# Patient Record
Sex: MALE | Race: BLACK OR AFRICAN AMERICAN | URBAN - METROPOLITAN AREA
[De-identification: names, ages, dates, MRNs, and addresses within clinical notes are randomized per-mention and may not be internally consistent; named-entity substitution may affect disease eponyms.]

---

## 2021-04-16 ENCOUNTER — CLAIMS CREATED FROM THE CLAIM WINDOW (OUTPATIENT)
Dept: URBAN - METROPOLITAN AREA CLINIC 17 | Facility: CLINIC | Age: 51
End: 2021-04-16
Payer: COMMERCIAL

## 2021-04-16 ENCOUNTER — WEB ENCOUNTER (OUTPATIENT)
Dept: URBAN - METROPOLITAN AREA CLINIC 17 | Facility: CLINIC | Age: 51
End: 2021-04-16

## 2021-04-16 ENCOUNTER — DASHBOARD ENCOUNTERS (OUTPATIENT)
Age: 51
End: 2021-04-16

## 2021-04-16 DIAGNOSIS — Z12.11 SCREEN FOR COLON CANCER: ICD-10-CM

## 2021-04-16 DIAGNOSIS — Z79.4 LONG TERM (CURRENT) USE OF INSULIN: ICD-10-CM

## 2021-04-16 DIAGNOSIS — K21.00 GASTROESOPHAGEAL REFLUX DISEASE WITH ESOPHAGITIS WITHOUT HEMORRHAGE: ICD-10-CM

## 2021-04-16 DIAGNOSIS — K92.89 GAS BLOAT SYNDROME: ICD-10-CM

## 2021-04-16 DIAGNOSIS — E55.9 VITAMIN D DEFICIENCY DISEASE: ICD-10-CM

## 2021-04-16 DIAGNOSIS — E65 CENTRAL OBESITY: ICD-10-CM

## 2021-04-16 DIAGNOSIS — R49.0 HOARSENESS OF VOICE: ICD-10-CM

## 2021-04-16 DIAGNOSIS — E11.9 TYPE 2 DIABETES MELLITUS WITHOUT COMPLICATIONS: ICD-10-CM

## 2021-04-16 PROBLEM — 248311001: Status: ACTIVE | Noted: 2021-04-16

## 2021-04-16 PROBLEM — 34713006: Status: ACTIVE | Noted: 2021-04-16

## 2021-04-16 PROBLEM — 710815001: Status: ACTIVE | Noted: 2021-04-16

## 2021-04-16 PROCEDURE — 99244 OFF/OP CNSLTJ NEW/EST MOD 40: CPT | Performed by: INTERNAL MEDICINE

## 2021-04-16 PROCEDURE — 99204 OFFICE O/P NEW MOD 45 MIN: CPT | Performed by: INTERNAL MEDICINE

## 2021-04-16 RX ORDER — SEVELAMER CARBONATE 800 MG/1
TABLET, FILM COATED ORAL
Qty: 480 UNSPECIFIED | Status: ACTIVE | COMMUNITY

## 2021-04-16 RX ORDER — SEVELAMER CARBONATE 800 MG/1
1 TABLET WITH MEALS TABLET, FILM COATED ORAL THREE TIMES A DAY
Status: ACTIVE | COMMUNITY

## 2021-04-16 RX ORDER — INSULIN GLARGINE 100 [IU]/ML
INJECTION, SOLUTION SUBCUTANEOUS
Qty: 30 UNSPECIFIED | Status: ACTIVE | COMMUNITY

## 2021-04-16 RX ORDER — INSULIN ASPART 100 [IU]/ML
INJECTION, SOLUTION INTRAVENOUS; SUBCUTANEOUS
Qty: 10 UNSPECIFIED | Status: ACTIVE | COMMUNITY

## 2021-04-16 NOTE — HPI-TODAY'S VISIT:
The pt with history of type 2 DM and hyperlipidemia who presents for a screening colon. The pt notes that he has  had intermittent heartburn and indigestion . He notes that he will skip breakfast and will have fast food and for dinner he will have beef and pork.  He notes that he drinks juice daily. He notes that he has hoarseness of voice. The pt notes that he has smoke cigarettes in the past and he quit smoking pot and cigarettes.,  The patient's consulatation note will be sent to the referring MD, Dr. Yobani Redmond.

## 2021-04-19 PROBLEM — 313436004: Status: ACTIVE | Noted: 2021-04-16

## 2021-04-22 ENCOUNTER — OFFICE VISIT (OUTPATIENT)
Dept: URBAN - METROPOLITAN AREA CLINIC 16 | Facility: CLINIC | Age: 51
End: 2021-04-22
Payer: COMMERCIAL

## 2021-04-22 DIAGNOSIS — Q61.02 BILATERAL RENAL CYSTS: ICD-10-CM

## 2021-04-22 DIAGNOSIS — N28.9 KIDNEY DISEASE: ICD-10-CM

## 2021-04-22 PROCEDURE — 76700 US EXAM ABDOM COMPLETE: CPT | Performed by: INTERNAL MEDICINE

## 2021-04-22 RX ORDER — INSULIN ASPART 100 [IU]/ML
INJECTION, SOLUTION INTRAVENOUS; SUBCUTANEOUS
Qty: 10 UNSPECIFIED | Status: ACTIVE | COMMUNITY

## 2021-04-22 RX ORDER — INSULIN GLARGINE 100 [IU]/ML
INJECTION, SOLUTION SUBCUTANEOUS
Qty: 30 UNSPECIFIED | Status: ACTIVE | COMMUNITY

## 2021-04-22 RX ORDER — SEVELAMER CARBONATE 800 MG/1
1 TABLET WITH MEALS TABLET, FILM COATED ORAL THREE TIMES A DAY
Status: ACTIVE | COMMUNITY

## 2021-04-22 RX ORDER — SEVELAMER CARBONATE 800 MG/1
TABLET, FILM COATED ORAL
Qty: 480 UNSPECIFIED | Status: ACTIVE | COMMUNITY

## 2021-11-05 ENCOUNTER — OFFICE VISIT (OUTPATIENT)
Dept: URBAN - METROPOLITAN AREA SURGERY CENTER 16 | Facility: SURGERY CENTER | Age: 51
End: 2021-11-05

## 2022-03-04 PROBLEM — 266433003: Status: ACTIVE | Noted: 2022-03-04

## 2022-03-15 ENCOUNTER — OFFICE VISIT (OUTPATIENT)
Dept: URBAN - METROPOLITAN AREA SURGERY CENTER 16 | Facility: SURGERY CENTER | Age: 52
End: 2022-03-15

## 2022-09-15 ENCOUNTER — HOSPITAL ENCOUNTER (OUTPATIENT)
Dept: HOSPITAL 5 - GIO | Age: 52
Discharge: HOME | End: 2022-09-15
Attending: INTERNAL MEDICINE
Payer: MEDICARE

## 2022-09-15 VITALS — SYSTOLIC BLOOD PRESSURE: 127 MMHG | DIASTOLIC BLOOD PRESSURE: 80 MMHG

## 2022-09-15 DIAGNOSIS — K44.9: ICD-10-CM

## 2022-09-15 DIAGNOSIS — K63.5: ICD-10-CM

## 2022-09-15 DIAGNOSIS — R63.4: ICD-10-CM

## 2022-09-15 DIAGNOSIS — K62.1: ICD-10-CM

## 2022-09-15 DIAGNOSIS — Z87.891: ICD-10-CM

## 2022-09-15 DIAGNOSIS — I12.0: ICD-10-CM

## 2022-09-15 DIAGNOSIS — N18.6: ICD-10-CM

## 2022-09-15 DIAGNOSIS — D64.9: Primary | ICD-10-CM

## 2022-09-15 DIAGNOSIS — Z79.4: ICD-10-CM

## 2022-09-15 DIAGNOSIS — Z99.2: ICD-10-CM

## 2022-09-15 DIAGNOSIS — E11.22: ICD-10-CM

## 2022-09-15 DIAGNOSIS — Z79.899: ICD-10-CM

## 2022-09-15 DIAGNOSIS — K29.70: ICD-10-CM

## 2022-09-15 DIAGNOSIS — Z90.49: ICD-10-CM

## 2022-09-15 DIAGNOSIS — Z86.73: ICD-10-CM

## 2022-09-15 PROCEDURE — 88342 IMHCHEM/IMCYTCHM 1ST ANTB: CPT

## 2022-09-15 PROCEDURE — 43239 EGD BIOPSY SINGLE/MULTIPLE: CPT

## 2022-09-15 PROCEDURE — 45385 COLONOSCOPY W/LESION REMOVAL: CPT

## 2022-09-15 PROCEDURE — 82962 GLUCOSE BLOOD TEST: CPT

## 2022-09-15 PROCEDURE — 45384 COLONOSCOPY W/LESION REMOVAL: CPT

## 2022-09-15 PROCEDURE — 45381 COLONOSCOPY SUBMUCOUS NJX: CPT

## 2022-09-15 PROCEDURE — 45388 COLONOSCOPY W/ABLATION: CPT

## 2022-09-15 PROCEDURE — 88305 TISSUE EXAM BY PATHOLOGIST: CPT

## 2022-09-15 NOTE — ANESTHESIA CONSULTATION
Anesthesia Consult and Med Hx


Date of service: 09/15/22





- Airway


Anesthetic Teeth Evaluation: Good


ROM Head & Neck: Adequate


Mental/Hyoid Distance: Adequate


Mallampati Class: Class III


Intubation Access Assessment: Possibly Difficult





- Pre-Operative Health Status


ASA Pre-Surgery Classification: ASA3


Proposed Anesthetic Plan: MAC





- Pulmonary


Hx Smoking: Yes (former smoker)


Hx Respiratory Symptoms: No





- Cardiovascular System


Hx Hypertension: Yes





- Central Nervous System


CVA: Yes (2010, no defecits)





- Endocrine


Hx End Stage Renal Disease: Yes (last HD 9/14/22)


Hx Liver Disease: No


Hx Insulin Dependent Diabetes: Yes


Hx Thyroid Disease: No

## 2022-09-15 NOTE — OPERATIVE REPORT
Operative Report


Operative Report: 


Colonoscopy with ablation with electrocoagulation and tissue destruction, 

multiple hot biopsy polypectomies, submucosal injection and cold snare 

polypectomy





DATE:_





ATTENDING PHYSICIAN:  Erik Goldstein M.D.





:  Erik Goldstein M.D.





INDICATIONS: Patient is a 51 y.o. male  who presents with history of profound 

anemia and weight loss.  screening . A colonoscopy is done for colorectal cancer

screening and to evaluate patient's symptoms so that treatment may be directed 

based on the findings. 








CONSENT:  Informed consent was obtained after the patient was advised


regarding the nature of this procedure, its indications, potential benefits as


well as possible complications including but not limited to bleeding,


perforation, adverse reaction to medications, infection as well as


cardiopulmonary complications.  An informed written and verbal consent was


then obtained after due opportunity was provided for questions and answers.





MONITORING:  Patient monitored continuously with pulse oximetry,


electrocardiographic recordings as well as automatic blood pressure


recordings.  Patient remained stable throughout the procedure with no untoward


events.





PREOPERATIVE ASSESSMENT:  Patient was assessed immediately prior to this


procedure for capacity to tolerate moderate sedation/monitored anesthesia


care.  American anesthesiology association classification is 2.  Mallampati


class is 2.  Hyomental distance is 3.





INSTRUMENT:  Olympus video colonoscope [id number].





MEDICATIONS:  Propofol given intravenously in divided doses.  For details,


please refer to anesthesia records.





DESCRIPTION OF PROCEDURE:  Patient was placed in the left lateral decubitus


position, after achieving sedation, a digital rectal examination was performed


following which the colonoscope was introduced into the anal verge and


advanced under direct visualization to the cecum which was identified by the


ileocecal valve, the appendiceal orifice, the cecal strap as well as by direct


transillumination in the right lower quadrant.  Color texture mucosa and


anatomy of the colon were carefully examined with the colonoscope.  The


colonoscope was then gently withdrawn with careful inspection of all mucosa


surfaces.  The patient tolerated the procedure well with no complications.


After completion of the examination, patient was transferred to the recovery


room.  The prep written regimen was GoLYTELY and the preparation was fair.


The following findings were noted.











FINDINGS:  Patient had multiple diminutive polyps in the rectum which were 

ablated with electrocoagulation with complete tissue destruction of diminutive 

polyps. Also, patient had additional flat polyps measuring approximately 4-5 mm 

that were removed by hot biopsy polypectomy and retrieved. In the sigmoid colon,

patient had multiple diminutive polyps that were again ablated by 

electrocoagulation with tissue destruction. In the transverse colon, patient had

a flat 8 mm to 10 mm polyp which was elevated with an cold snap polypectomy.


Of saline and cold snare polypectomy and retrieved. The rest of the colon to the

cecum was normal.  On the retroflexed view at the anal verge patient had 

internal hemorrhoids.





IMPRESSION: Multiple diminutive polyps in the rectum status post ablation with 

electrocoagulation with polyp tissue destruction.


Multiple diminutive polyps in the rectum status post hot biopsy polypectomy


Multiple diminutive polyps in the sigmoid colon status post ablation with  

electrocoagulation.


Flat transverse colon polyp status post submucosal injection and cold snare 

polypectomy.


Internal Hemorrhoids .





PLAN: Follow-up pathology report


High fiber diet.


Repeat colonoscopy in 5 year.

## 2022-09-15 NOTE — OPERATIVE REPORT
Operative Report


Operative Report: 


DATE: 09/15/2022








Esophagogastroduodenoscopy with  multiple mucosal biopsies.





ATTENDING PHYSICIAN:  Erik Goldstein M.D.





:  Erik Goldstein M.D.





INDICATION:  Patient is a 51 year old male who presents with history of 

dyspepsia, with weight loss and anemia.  An upper endoscopy is done to assess pa

tient so that treatment may be directed based on the findings.








CONSENT:  Informed consent was obtained after the patient was advised


regarding the nature of this procedure, its indications, potential benefits as


well as possible complications including but not limited to bleeding,


perforation, adverse reaction to medications, infection as well as


cardiopulmonary complications.  An informed written and verbal consent was


then obtained after due opportunity was provided for questions and answers.





MONITORING:  Patient monitored continuously with pulse oximetry,


electrocardiographic recordings as well as automatic blood pressure


recordings. Patient remained stable throughout the procedure with no untoward


events.





PREOPERATIVE ASSESSMENT:  Patient was assessed immediately prior to this


procedure for capacity to tolerate moderate sedation/monitored anesthesia


care.  American anesthesiology association classification is 2.   Mallampatti


class is 2.  Hyomental distance is 3.





INSTRUMENT:  Olympus video endoscope GIF .





MEDICATIONS:  Propofol given intravenously in divided doses.  For details,


please refer to anesthesia records.





DESCRIPTION OF PROCEDURE:  Patient was placed in the left lateral decubitus


position, after achieving sedation, the endoscope was introduced into the


esophagus and advanced under direct visualization into the stomach and then to


the second portion of the duodenum.  Color texture mucosa and anatomy of the


upper gastrointestinal tract was carefully examined with the endoscope which


was then gently withdrawn with careful inspection of all mucosa surfaces.  The


patient tolerated the procedure well with no complications.  After completion


of the examination, patient was transferred to the recovery room.  The


preparation was fair.  The following findings were noted.








FINDINGS:The esophagus waas normal. The Z line was irregular at 40 cm. There is 

a 2 cm hiatal hernia. There is antral erythema. Biopsies of the antrum. The 

pylorus was normal. The duodenum was normal to the third portion.


 


IMPRESSION: Irregular Z line





Small diminutive Hiatal Hernia


Gastric antral erythema





PLAN: Follow-up pathology report


Evaluate patient further with  colonoscopy.

## 2023-10-26 NOTE — PHYSICAL EXAM CONSTITUTIONAL:
Improved, although not compliant  +/- LUTS  PSA: 3.17 and UA neg for UTI (10/2021)  Started Tamsulosin 0.4mg at bedtime. Compliant discussed  Monitor   well developed, well nourished , in no acute distress , ambulating without difficulty , normal communication ability